# Patient Record
Sex: FEMALE | Race: WHITE | NOT HISPANIC OR LATINO | Employment: FULL TIME | URBAN - METROPOLITAN AREA
[De-identification: names, ages, dates, MRNs, and addresses within clinical notes are randomized per-mention and may not be internally consistent; named-entity substitution may affect disease eponyms.]

---

## 2019-05-20 ENCOUNTER — OFFICE VISIT (OUTPATIENT)
Dept: OBSTETRICS AND GYNECOLOGY | Facility: CLINIC | Age: 19
End: 2019-05-20
Payer: COMMERCIAL

## 2019-05-20 VITALS
WEIGHT: 161 LBS | SYSTOLIC BLOOD PRESSURE: 128 MMHG | BODY MASS INDEX: 25.27 KG/M2 | HEART RATE: 82 BPM | DIASTOLIC BLOOD PRESSURE: 87 MMHG | HEIGHT: 67 IN

## 2019-05-20 DIAGNOSIS — N92.1 METRORRHAGIA: Primary | ICD-10-CM

## 2019-05-20 PROCEDURE — 3008F PR BODY MASS INDEX (BMI) DOCUMENTED: ICD-10-PCS | Mod: CPTII,S$GLB,, | Performed by: OBSTETRICS & GYNECOLOGY

## 2019-05-20 PROCEDURE — 99204 OFFICE O/P NEW MOD 45 MIN: CPT | Mod: S$GLB,,, | Performed by: OBSTETRICS & GYNECOLOGY

## 2019-05-20 PROCEDURE — 99204 PR OFFICE/OUTPT VISIT, NEW, LEVL IV, 45-59 MIN: ICD-10-PCS | Mod: S$GLB,,, | Performed by: OBSTETRICS & GYNECOLOGY

## 2019-05-20 PROCEDURE — 3008F BODY MASS INDEX DOCD: CPT | Mod: CPTII,S$GLB,, | Performed by: OBSTETRICS & GYNECOLOGY

## 2019-05-20 RX ORDER — NORGESTIMATE AND ETHINYL ESTRADIOL 0.25-0.035
KIT ORAL
Refills: 5 | COMMUNITY
Start: 2019-04-12 | End: 2019-05-20 | Stop reason: ALTCHOICE

## 2019-05-20 RX ORDER — NORETHINDRONE ACETATE AND ETHINYL ESTRADIOL 1MG-20(21)
1 KIT ORAL DAILY
Qty: 28 TABLET | Refills: 11 | Status: SHIPPED | OUTPATIENT
Start: 2019-05-20 | End: 2020-05-19

## 2019-05-20 NOTE — PROGRESS NOTES
Subjective:       Patient ID: Andree Jimenez is a 18 y.o. female.    Chief Complaint:  Menometrorrhagia      History of Present Illness  Pt here for irregular bleeding.  History and past labs reviewed with patient.    Complaints of irregular bleeding x 2 months. Has been on sprintec x 1 year and started having spotting between cycles as well as newly extremely heavy cycles q 2 weeks. Has not bled in 3 weeks. Ran out of sprintec       Review of Systems  Review of Systems   Constitutional: Negative for chills and fever.   Respiratory: Negative for shortness of breath.    Cardiovascular: Negative for chest pain.   Gastrointestinal: Negative for abdominal pain, blood in stool, constipation, diarrhea, nausea, vomiting and reflux.   Genitourinary: Negative for dysmenorrhea, dyspareunia, dysuria, hematuria, hot flashes, menorrhagia, menstrual problem, pelvic pain, vaginal bleeding, vaginal discharge, postcoital bleeding and vaginal dryness.   Musculoskeletal: Negative for arthralgias and joint swelling.   Integumentary:  Negative for rash, hair changes, breast mass, nipple discharge and breast skin changes.   Psychiatric/Behavioral: Negative for depression. The patient is not nervous/anxious.    Breast: Negative for asymmetry, lump, mass, nipple discharge and skin changes          Objective:    Physical Exam:   Constitutional: She appears well-developed and well-nourished. No distress.    HENT:   Head: Normocephalic and atraumatic.    Eyes: Conjunctivae and EOM are normal.    Neck: No tracheal deviation present. No thyromegaly present.    Cardiovascular: Exam reveals no clubbing, no cyanosis and no edema.     Pulmonary/Chest: Effort normal. No respiratory distress.        Abdominal: Soft. She exhibits no distension and no mass. There is no tenderness. There is no rebound and no guarding. No hernia.     Genitourinary: Rectum normal, vagina normal and uterus normal. Pelvic exam was performed with patient supine. There is no  rash, tenderness, lesion or injury on the right labia. There is no rash, tenderness, lesion or injury on the left labia. Cervix is normal. Right adnexum displays no mass, no tenderness and no fullness. Left adnexum displays no mass, no tenderness and no fullness.                Skin: She is not diaphoretic. No cyanosis. Nails show no clubbing.         breast exam wnl- no nipple dc, skin changes, masses or lymph nodes palpated bilaterally    Assessment:        1. Metrorrhagia                Plan:      Start junel OCP   D/c sprintec  Discussed medication holiday if needed   RTC PRN

## 2019-10-17 ENCOUNTER — OFFICE VISIT (OUTPATIENT)
Dept: OBSTETRICS AND GYNECOLOGY | Facility: CLINIC | Age: 19
End: 2019-10-17
Payer: COMMERCIAL

## 2019-10-17 VITALS
DIASTOLIC BLOOD PRESSURE: 86 MMHG | BODY MASS INDEX: 26.06 KG/M2 | HEIGHT: 67 IN | WEIGHT: 166 LBS | HEART RATE: 69 BPM | SYSTOLIC BLOOD PRESSURE: 129 MMHG

## 2019-10-17 DIAGNOSIS — R10.2 PELVIC PAIN IN FEMALE: Primary | ICD-10-CM

## 2019-10-17 DIAGNOSIS — N92.1 METRORRHAGIA: ICD-10-CM

## 2019-10-17 PROCEDURE — 3008F BODY MASS INDEX DOCD: CPT | Mod: CPTII,S$GLB,, | Performed by: OBSTETRICS & GYNECOLOGY

## 2019-10-17 PROCEDURE — 99214 OFFICE O/P EST MOD 30 MIN: CPT | Mod: S$GLB,,, | Performed by: OBSTETRICS & GYNECOLOGY

## 2019-10-17 PROCEDURE — 3008F PR BODY MASS INDEX (BMI) DOCUMENTED: ICD-10-PCS | Mod: CPTII,S$GLB,, | Performed by: OBSTETRICS & GYNECOLOGY

## 2019-10-17 PROCEDURE — 99214 PR OFFICE/OUTPT VISIT, EST, LEVL IV, 30-39 MIN: ICD-10-PCS | Mod: S$GLB,,, | Performed by: OBSTETRICS & GYNECOLOGY

## 2019-10-18 LAB
CHLAMYDIA: NEGATIVE
GONORRHEA: NEGATIVE
SOURCE: NORMAL
SOURCE: NORMAL
TRICHOMONAS AMPLIFIED: NEGATIVE

## 2019-10-18 NOTE — PROGRESS NOTES
Subjective:       Patient ID: Andree Jimenez is a 19 y.o. female.    Chief Complaint:  Metrorrhagia and Pelvic Pain      History of Present Illness  Pt here for irregular bleeding.  History and past labs reviewed with patient.    Complaints of still having irregular bleeding and pelvic pain. + pain with intercourse       Review of Systems  Review of Systems   Constitutional: Negative for chills and fever.   Respiratory: Negative for shortness of breath.    Cardiovascular: Negative for chest pain.   Gastrointestinal: Negative for abdominal pain, blood in stool, constipation, diarrhea, nausea, vomiting and reflux.   Genitourinary: Negative for dysmenorrhea, dyspareunia, dysuria, hematuria, hot flashes, menorrhagia, menstrual problem, pelvic pain, vaginal bleeding, vaginal discharge, postcoital bleeding and vaginal dryness.   Musculoskeletal: Negative for arthralgias and joint swelling.   Integumentary:  Negative for rash, hair changes, breast mass, nipple discharge and breast skin changes.   Psychiatric/Behavioral: Negative for depression. The patient is not nervous/anxious.    Breast: Negative for asymmetry, lump, mass, nipple discharge and skin changes          Objective:    Physical Exam:   Constitutional: She appears well-developed and well-nourished. No distress.    HENT:   Head: Normocephalic and atraumatic.    Eyes: Conjunctivae and EOM are normal.    Neck: No tracheal deviation present. No thyromegaly present.    Cardiovascular: Exam reveals no clubbing, no cyanosis and no edema.     Pulmonary/Chest: Effort normal. No respiratory distress.        Abdominal: Soft. She exhibits no distension and no mass. There is no tenderness. There is no rebound and no guarding. No hernia.     Genitourinary: Rectum normal, vagina normal and uterus normal. Pelvic exam was performed with patient supine. There is no rash, tenderness, lesion or injury on the right labia. There is no rash, tenderness, lesion or injury on the left  labia. Cervix is normal. Right adnexum displays no mass, no tenderness and no fullness. Left adnexum displays no mass, no tenderness and no fullness.                Skin: She is not diaphoretic. No cyanosis. Nails show no clubbing.           Assessment:        1. Pelvic pain in female    2. Metrorrhagia                Plan:      STD panel   Will get US   RTC after US

## 2019-10-21 ENCOUNTER — PROCEDURE VISIT (OUTPATIENT)
Dept: OBSTETRICS AND GYNECOLOGY | Facility: CLINIC | Age: 19
End: 2019-10-21
Payer: COMMERCIAL

## 2019-10-21 DIAGNOSIS — R10.2 PELVIC PAIN IN FEMALE: ICD-10-CM

## 2019-10-21 DIAGNOSIS — N92.1 METRORRHAGIA: Primary | ICD-10-CM

## 2019-10-21 DIAGNOSIS — N92.1 METRORRHAGIA: ICD-10-CM

## 2019-10-21 PROCEDURE — 76830 TRANSVAGINAL US NON-OB: CPT | Mod: S$GLB,,, | Performed by: OBSTETRICS & GYNECOLOGY

## 2019-10-21 PROCEDURE — 76830 PR  ECHOGRAPHY,TRANSVAGINAL: ICD-10-PCS | Mod: S$GLB,,, | Performed by: OBSTETRICS & GYNECOLOGY

## 2022-08-25 PROBLEM — O43.102 PLACENTAL ABNORMALITY IN SECOND TRIMESTER: Status: ACTIVE | Noted: 2022-08-25

## 2024-03-28 PROBLEM — N92.1 METRORRHAGIA: Status: RESOLVED | Noted: 2019-05-20 | Resolved: 2024-03-28

## 2024-03-28 PROBLEM — E89.0 HISTORY OF THYROIDECTOMY, TOTAL: Status: ACTIVE | Noted: 2024-03-28

## 2024-03-28 PROBLEM — C73 PAPILLARY THYROID CARCINOMA: Status: ACTIVE | Noted: 2024-03-28

## 2024-03-28 PROBLEM — O43.102 PLACENTAL ABNORMALITY IN SECOND TRIMESTER: Status: RESOLVED | Noted: 2022-08-25 | Resolved: 2024-03-28

## 2024-03-28 PROBLEM — E89.0 POSTOPERATIVE HYPOTHYROIDISM: Status: ACTIVE | Noted: 2024-03-28

## 2024-03-28 PROBLEM — O09.892 SHORT INTERVAL BETWEEN PREGNANCIES AFFECTING PREGNANCY IN SECOND TRIMESTER, ANTEPARTUM: Status: ACTIVE | Noted: 2024-03-28
